# Patient Record
Sex: MALE | Race: WHITE | NOT HISPANIC OR LATINO | URBAN - METROPOLITAN AREA
[De-identification: names, ages, dates, MRNs, and addresses within clinical notes are randomized per-mention and may not be internally consistent; named-entity substitution may affect disease eponyms.]

---

## 2025-01-03 ENCOUNTER — OFFICE VISIT (OUTPATIENT)
Dept: PRIMARY CARE | Facility: CLINIC | Age: 60
End: 2025-01-03
Payer: COMMERCIAL

## 2025-01-03 VITALS
WEIGHT: 158 LBS | SYSTOLIC BLOOD PRESSURE: 137 MMHG | BODY MASS INDEX: 26.33 KG/M2 | OXYGEN SATURATION: 98 % | HEART RATE: 63 BPM | DIASTOLIC BLOOD PRESSURE: 84 MMHG | HEIGHT: 65 IN

## 2025-01-03 DIAGNOSIS — J06.9 UPPER RESPIRATORY TRACT INFECTION, UNSPECIFIED TYPE: Primary | ICD-10-CM

## 2025-01-03 DIAGNOSIS — Z00.00 PREVENTATIVE HEALTH CARE: ICD-10-CM

## 2025-01-03 PROCEDURE — 99214 OFFICE O/P EST MOD 30 MIN: CPT | Performed by: INTERNAL MEDICINE

## 2025-01-03 PROCEDURE — 1036F TOBACCO NON-USER: CPT | Performed by: INTERNAL MEDICINE

## 2025-01-03 PROCEDURE — 3008F BODY MASS INDEX DOCD: CPT | Performed by: INTERNAL MEDICINE

## 2025-01-03 RX ORDER — AZITHROMYCIN 250 MG/1
TABLET, FILM COATED ORAL
Qty: 6 TABLET | Refills: 0 | Status: SHIPPED | OUTPATIENT
Start: 2025-01-03 | End: 2025-01-08

## 2025-01-03 ASSESSMENT — PATIENT HEALTH QUESTIONNAIRE - PHQ9
SUM OF ALL RESPONSES TO PHQ9 QUESTIONS 1 & 2: 0
2. FEELING DOWN, DEPRESSED OR HOPELESS: NOT AT ALL
1. LITTLE INTEREST OR PLEASURE IN DOING THINGS: NOT AT ALL

## 2025-01-03 NOTE — PROGRESS NOTES
"Subjective   Patient ID: Rivas Lee is a 59 y.o. male who presents for Cough (Since thanksgiving).    HPI   Has been having cough, throat congestion since thanksgiving. Tried robitussin at onset which helped a little. Overall not getting better or worse.  No fever throughout.  Sputum has been clear.  No dyspnea.    Review of Systems    Objective   /84 (BP Location: Right arm, Patient Position: Sitting)   Pulse 63   Ht 1.651 m (5' 5\")   Wt 71.7 kg (158 lb)   SpO2 98%   BMI 26.29 kg/m²     Physical Exam  Constitutional:       Appearance: Normal appearance.   HENT:      Right Ear: Tympanic membrane and ear canal normal.      Left Ear: Tympanic membrane and ear canal normal.      Nose: Congestion present.      Mouth/Throat:      Mouth: Mucous membranes are moist.   Cardiovascular:      Rate and Rhythm: Normal rate and regular rhythm.      Heart sounds: No murmur heard.  Pulmonary:      Effort: Pulmonary effort is normal.   Neurological:      Mental Status: He is alert.         Assessment/Plan   Problem List Items Addressed This Visit             ICD-10-CM    URI (upper respiratory infection) - Primary J06.9    Relevant Medications    azithromycin (Zithromax) 250 mg tablet    [facility] care Z00.00    Relevant Orders    CBC    Comprehensive Metabolic Panel    Lipid Panel          Prolonged URI - Treat with azithromycin. Call or return if not resolving.  Routine labs ordered  "

## 2025-05-18 ENCOUNTER — OFFICE VISIT (OUTPATIENT)
Dept: URGENT CARE | Age: 60
End: 2025-05-18
Payer: COMMERCIAL

## 2025-05-18 ENCOUNTER — ANCILLARY PROCEDURE (OUTPATIENT)
Dept: URGENT CARE | Age: 60
End: 2025-05-18
Payer: COMMERCIAL

## 2025-05-18 VITALS
HEIGHT: 68 IN | OXYGEN SATURATION: 95 % | RESPIRATION RATE: 18 BRPM | TEMPERATURE: 97.6 F | WEIGHT: 150 LBS | SYSTOLIC BLOOD PRESSURE: 119 MMHG | DIASTOLIC BLOOD PRESSURE: 83 MMHG | HEART RATE: 75 BPM | BODY MASS INDEX: 22.73 KG/M2

## 2025-05-18 DIAGNOSIS — M25.562 ACUTE PAIN OF LEFT KNEE: ICD-10-CM

## 2025-05-18 PROCEDURE — 73562 X-RAY EXAM OF KNEE 3: CPT | Mod: LEFT SIDE | Performed by: PHYSICIAN ASSISTANT

## 2025-05-18 RX ORDER — CYCLOBENZAPRINE HCL 10 MG
TABLET ORAL
Qty: 20 TABLET | Refills: 0 | Status: SHIPPED | OUTPATIENT
Start: 2025-05-18

## 2025-05-18 RX ORDER — PREDNISONE 20 MG/1
20 TABLET ORAL DAILY
Qty: 5 TABLET | Refills: 0 | Status: SHIPPED | OUTPATIENT
Start: 2025-05-18 | End: 2025-05-23

## 2025-05-18 ASSESSMENT — PATIENT HEALTH QUESTIONNAIRE - PHQ9
2. FEELING DOWN, DEPRESSED OR HOPELESS: NOT AT ALL
SUM OF ALL RESPONSES TO PHQ9 QUESTIONS 1 AND 2: 0
1. LITTLE INTEREST OR PLEASURE IN DOING THINGS: NOT AT ALL

## 2025-05-19 ASSESSMENT — ENCOUNTER SYMPTOMS: ARTHRALGIAS: 1

## 2025-05-19 NOTE — PROGRESS NOTES
"Subjective   Patient ID: Rivas Lee is a 59 y.o. male. They present today with a chief complaint of Knee Pain.    History of Present Illness  Patient is a 59 year old male presenting for evaluation of left knee pain. Symptoms started one week ago but have been worsening. He denies injury or trauma. 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 Pain is worse when knee is flexed and he flexes at hip or with quicker movements like when walking dog. He has tried biofreeze without much relief. Pain is medial inferior knee, not bony. He reports the feeling like it needs to pop or it is jammed. No previous issues with this knee. Denies redness or swelling to knee. Had a shoulder injury in the past and flexeril helped, wondering if it would also work for the knee.       History provided by:  Patient   used: No    Knee Pain         Past Medical History  Allergies as of 05/18/2025    (No Known Allergies)       Prescriptions Prior to Admission[1]     Medical History[2]    Surgical History[3]     reports that he has never smoked. He has never been exposed to tobacco smoke. He has never used smokeless tobacco. He reports current alcohol use of about 1.0 standard drink of alcohol per week. He reports that he does not use drugs.    Review of Systems  Review of Systems   Musculoskeletal:  Positive for arthralgias.                                  Objective    Vitals:    05/18/25 1811   BP: 119/83   Pulse: 75   Resp: 18   Temp: 36.4 °C (97.6 °F)   SpO2: 95%   Weight: 68 kg (150 lb)   Height: 1.727 m (5' 8\")     No LMP for male patient.    Physical Exam  Constitutional:       General: He is not in acute distress.     Appearance: Normal appearance. He is normal weight. He is not ill-appearing or toxic-appearing.   HENT:      Head: Normocephalic. No right periorbital erythema or left periorbital erythema.      Jaw: There is normal jaw " occlusion. No trismus.   Eyes:      General: No scleral icterus.        Right eye: No discharge.         Left eye: No discharge.      Conjunctiva/sclera: Conjunctivae normal.   Neck:      Trachea: Phonation normal.   Cardiovascular:      Rate and Rhythm: Normal rate.   Pulmonary:      Effort: Pulmonary effort is normal. No respiratory distress.      Breath sounds: Normal breath sounds. No stridor. No wheezing.   Musculoskeletal:      Comments: Tenderness over left medial inferior knee soft tissues, no bony tenderness or deformity   Flexion and extension at knee intact and able to straight leg raise   LLE neurovascularly intact    Neurological:      General: No focal deficit present.      Mental Status: He is alert.      Gait: Gait normal.   Psychiatric:         Mood and Affect: Mood normal.         Behavior: Behavior normal.         Thought Content: Thought content normal.         Procedures    Point of Care Test & Imaging Results from this visit  No results found for this visit on 05/18/25.   Imaging  XR knee left 3 views  Result Date: 5/18/2025  No acute abnormality seen. Minimal degenerative changes     MACRO: None   Signed by: Shun Jorge 5/18/2025 6:58 PM Dictation workstation:   [id number]      Cardiology, Vascular, and Other Imaging  No other imaging results found for the past 2 days      Diagnostic study results (if any) were reviewed by Gail Ayala PA-C.    Assessment/Plan   Allergies, medications, history, and pertinent labs/EKGs/Imaging reviewed by Gail Ayala PA-C.     Medical Decision Making  Patient is a 59 year old male presenting for evaluation of atraumatic left knee pain. Hemodynamically stable. Exam without bony deformities or tenderness and FROM. LLE neurovascularly intact. XR without fracture or dislocation but mild degenerative changes. Exam without findings of septic joint or gout. Possible osteoarthritis flare versus ligamentous injury. Will refer to ortho for follow up and  provide prednisone and muscle relaxer for symptom control/inflammation. Continue RiCE.     At time of discharge, patient was clinically well-appearing and appropriate for outpatient management. The patient/parent/guardian was educated regarding diagnosis, supportive care, OTC and Rx medications. The patient/parent/guardian was given the opportunity to ask questions prior to discharge. They verbalized understanding of discussion of treatment plan, expected course of illness and/or injury, indications on when to return to , when to seek further evaluation in ED/call 911, and the need to follow up with PCP and/or specialist as referred. Patient/parent/guardian was provided with work/school documentation if requested. Patient stable upon discharge.     Orders and Diagnoses  Diagnoses and all orders for this visit:  Acute pain of left knee  -     XR knee left 3 views; Future  -     cyclobenzaprine (Flexeril) 10 mg tablet; 0.5-1 tablet every 8 hours as needed  -     predniSONE (Deltasone) 20 mg tablet; Take 1 tablet (20 mg) by mouth once daily for 5 days.  -     Referral to Orthopedics and Sports Medicine; Future      Medical Admin Record      Patient disposition: Home    Electronically signed by Gail Ayala PA-C  9:34 AM           [1] (Not in a hospital admission)   [2]   Past Medical History:  Diagnosis Date    Calculus of kidney     Calcium kidney stone    Encounter for general adult medical examination without abnormal findings     Encounter for preventive health examination    Personal history of other diseases of the respiratory system 12/30/2018    History of acute bronchitis   [3]   Past Surgical History:  Procedure Laterality Date    OTHER SURGICAL HISTORY  01/11/2022    Tonsillectomy